# Patient Record
Sex: MALE | Race: WHITE
[De-identification: names, ages, dates, MRNs, and addresses within clinical notes are randomized per-mention and may not be internally consistent; named-entity substitution may affect disease eponyms.]

---

## 2018-06-27 ENCOUNTER — HOSPITAL ENCOUNTER (EMERGENCY)
Dept: HOSPITAL 43 - DL.ED | Age: 18
Discharge: HOME | End: 2018-06-27
Payer: COMMERCIAL

## 2018-06-27 DIAGNOSIS — K21.9: Primary | ICD-10-CM

## 2018-06-27 PROCEDURE — 99283 EMERGENCY DEPT VISIT LOW MDM: CPT

## 2018-06-27 NOTE — EDM.PDOC
ED HPI GENERAL MEDICAL PROBLEM





- General


Chief Complaint: Gastrointestinal Problem


Stated Complaint: [id number] CHEST PAINS WHEN SWALLOWING


Time Seen by Provider: 06/27/18 20:05


Source of Information: Reports: Patient


History Limitations: Reports: No Limitations





- History of Present Illness


INITIAL COMMENTS - FREE TEXT/NARRATIVE: 





c/o acid taste and chest discomfort points mid epigastric past 3days,  

Describes as burning sensation. Taking lots of tums that help only for short 

period of time.  No vomiting. 


Treatments PTA: Reports: Other Medication(s)


  ** Epigastric


Pain Score (Numeric/FACES): 5





Social & Family History





- Tobacco Use


Smoking Status *Q: Unknown Ever Smoked





- Caffeine Use


Caffeine Use: Reports: Soda





- Recreational Drug Use


Recreational Drug Use: No





ED ROS GENERAL





- Review of Systems


Review Of Systems: ROS reveals no pertinent complaints other than HPI.





ED EXAM, GI/ABD





- Physical Exam


Exam: See Below


Exam Limited By: No Limitations


General Appearance: Alert, Mild Distress


Eyes: Bilateral: EOMI


Ears: Normal External Exam


Head: Atraumatic, Normocephalic


Neck: Full Range of Motion


Respiratory/Chest: No Respiratory Distress


GI/Abdominal Exam: Soft, Tender (epigastric)


Neurological: Alert, Oriented, Normal Cognition


Psychiatric: Flat Affect


Skin Exam: Warm, Dry, Intact





Course





- Vital Signs


Last Recorded V/S: 


 Last Vital Signs











Temp  98.3 F   06/27/18 20:00


 


Pulse  73   06/27/18 20:00


 


Resp  16   06/27/18 20:00


 


BP  132/77   06/27/18 20:00


 


Pulse Ox  98   06/27/18 20:00














- Orders/Labs/Meds


Meds: 


Medications














Discontinued Medications














Generic Name Dose Route Start Last Admin





  Trade Name Javier  PRN Reason Stop Dose Admin


 


Al Hydroxide/Mg Hydroxide  30 ml  06/27/18 19:55  06/27/18 19:56





  Gi Cocktail  PO  06/27/18 19:56  30 ml





  ONETIME ONE   Administration





     





     





     





     


 


Al Hydroxide/Mg Hydroxide  Confirm  06/27/18 23:57  





  Gi Cocktail  Administered  06/27/18 23:58  





  Dose   





  30 ml   





  .ROUTE   





  .STK-MED ONE   





     





     





     





     














- Re-Assessments/Exams


Free Text/Narrative Re-Assessment/Exam: 





06/28/18 04:45


brief relief of symptoms with GI  cocktail.





Departure





- Departure


Time of Disposition: 23:45


Disposition: Home, Self-Care 01


Condition: Good


Clinical Impression: 


Acid reflux disease


Qualifiers:


 Esophagitis presence: esophagitis presence not specified Qualified Code(s): 

K21.9 - Gastro-esophageal reflux disease without esophagitis








- Discharge Information


Instructions:  Gastroesophageal Reflux Disease, Adult, Easy-to-Read


Referrals: 


PCP,None [Primary Care Provider] - 


Forms:  ED Department Discharge


Additional Instructions: 


bland diet limit caffeine 


carafate one gram one 4 times daily 1/2 hour before meals and bed time


omeprazole 20mg one daily


follow up clinic one week